# Patient Record
Sex: MALE
[De-identification: names, ages, dates, MRNs, and addresses within clinical notes are randomized per-mention and may not be internally consistent; named-entity substitution may affect disease eponyms.]

---

## 2017-05-30 ENCOUNTER — HOSPITAL ENCOUNTER (OUTPATIENT)
Dept: HOSPITAL 31 - C.ENDO | Age: 52
Discharge: HOME | End: 2017-05-30
Attending: SPECIALIST
Payer: COMMERCIAL

## 2017-05-30 VITALS — RESPIRATION RATE: 18 BRPM

## 2017-05-30 VITALS — OXYGEN SATURATION: 98 % | TEMPERATURE: 97.5 F

## 2017-05-30 VITALS — HEART RATE: 65 BPM | SYSTOLIC BLOOD PRESSURE: 144 MMHG | DIASTOLIC BLOOD PRESSURE: 86 MMHG

## 2017-05-30 VITALS — BODY MASS INDEX: 27.2 KG/M2

## 2017-05-30 DIAGNOSIS — K64.8: ICD-10-CM

## 2017-05-30 DIAGNOSIS — K57.90: ICD-10-CM

## 2017-05-30 DIAGNOSIS — K58.9: ICD-10-CM

## 2017-05-30 DIAGNOSIS — K51.50: Primary | ICD-10-CM

## 2017-05-30 PROCEDURE — 45380 COLONOSCOPY AND BIOPSY: CPT

## 2017-05-30 PROCEDURE — 88305 TISSUE EXAM BY PATHOLOGIST: CPT

## 2017-05-30 NOTE — CP.SDSHP
[facility] Day [facility]





- History


Proposed Procedure: COLONSCOPY


Pre-Op Diagnosis: SCREENING





- Previous Medical/Surgical History


Misc: Other


Pain: 2.Mild Pain





- Allergies


Allergies: 


Allergies





ciprofloxacin [From Cipro] Adverse Reaction (Verified 05/30/17 10:31)


 FATIGUE











- Physical Exam


General Appearance: N


Vital Signs: 


 Vital Signs











  05/30/17





  10:35


 


Temperature 98 F


 


Pulse Rate 65


 


Respiratory 16





Rate 


 


Blood Pressure 119/78


 


O2 Sat by Pulse 100





Oximetry 











Mental Status: Alert & Oriented x3


Neuro: WNL


Heart: WNL


Lungs: WNL


GI: WNL





- {Optional Preform as Required}


Breast: WNL


Abdomen: WNL


Rectal: Other


Integument: WNL


: WNL


Ortho: WNL


ENT: WNL





- Impression


Pt. Evaluated Today:Candidate for Anesthesia & Procedure: Yes





- Date & Time


Time: 11:05





Short Stay Discharge





- Short Stay Discharge


Admitting Diagnosis/Reason for Visit: SCREENING FOR MALIGNANT NEOPLASM OF COLON


Disposition: HOME/ ROUTINE

## 2018-08-10 ENCOUNTER — HOSPITAL ENCOUNTER (INPATIENT)
Dept: HOSPITAL 31 - C.ER | Age: 53
LOS: 3 days | Discharge: HOME | DRG: 182 | End: 2018-08-13
Attending: INTERNAL MEDICINE | Admitting: INTERNAL MEDICINE
Payer: COMMERCIAL

## 2018-08-10 VITALS — BODY MASS INDEX: 33.2 KG/M2

## 2018-08-10 VITALS — RESPIRATION RATE: 20 BRPM

## 2018-08-10 DIAGNOSIS — F17.200: ICD-10-CM

## 2018-08-10 DIAGNOSIS — K62.5: ICD-10-CM

## 2018-08-10 DIAGNOSIS — K59.00: ICD-10-CM

## 2018-08-10 DIAGNOSIS — Z87.11: ICD-10-CM

## 2018-08-10 DIAGNOSIS — K57.32: Primary | ICD-10-CM

## 2018-08-10 DIAGNOSIS — G47.30: ICD-10-CM

## 2018-08-10 LAB
ALBUMIN SERPL-MCNC: 4.7 G/DL (ref 3.5–5)
ALBUMIN/GLOB SERPL: 1.4 {RATIO} (ref 1–2.1)
ALT SERPL-CCNC: 26 U/L (ref 21–72)
AST SERPL-CCNC: 15 U/L (ref 17–59)
BASOPHILS # BLD AUTO: 0 K/UL (ref 0–0.2)
BASOPHILS NFR BLD: 0.1 % (ref 0–2)
BUN SERPL-MCNC: 18 MG/DL (ref 9–20)
CALCIUM SERPL-MCNC: 9.9 MG/DL (ref 8.6–10.4)
EOSINOPHIL # BLD AUTO: 0 K/UL (ref 0–0.7)
EOSINOPHIL NFR BLD: 0 % (ref 0–4)
ERYTHROCYTE [DISTWIDTH] IN BLOOD BY AUTOMATED COUNT: 14 % (ref 11.5–14.5)
GFR NON-AFRICAN AMERICAN: > 60
HGB BLD-MCNC: 16.6 G/DL (ref 12–18)
LIPASE: 25 U/L (ref 23–300)
LYMPHOCYTE: 8 % (ref 20–40)
LYMPHOCYTES # BLD AUTO: 0.6 K/UL (ref 1–4.3)
LYMPHOCYTES NFR BLD AUTO: 5 % (ref 20–40)
MCH RBC QN AUTO: 31.1 PG (ref 27–31)
MCHC RBC AUTO-ENTMCNC: 35.7 G/DL (ref 33–37)
MCV RBC AUTO: 87.1 FL (ref 80–94)
MONOCYTE: 0 % (ref 0–10)
MONOCYTES # BLD: 0.2 K/UL (ref 0–0.8)
MONOCYTES NFR BLD: 1.3 % (ref 0–10)
NEUTROPHILS # BLD: 11.4 K/UL (ref 1.8–7)
NEUTROPHILS NFR BLD AUTO: 92 % (ref 50–75)
NEUTROPHILS NFR BLD AUTO: 93.6 % (ref 50–75)
NRBC BLD AUTO-RTO: 0.3 % (ref 0–2)
PLATELET # BLD EST: NORMAL 10*3/UL
PLATELET # BLD: 237 K/UL (ref 130–400)
PMV BLD AUTO: 7.6 FL (ref 7.2–11.7)
RBC # BLD AUTO: 5.33 MIL/UL (ref 4.4–5.9)
TOTAL CELLS COUNTED BLD: 100
WBC # BLD AUTO: 12.1 K/UL (ref 4.8–10.8)

## 2018-08-10 NOTE — C.PDOC
History Of Present Illness





<Reina Navas - Last Filed: 08/10/18 18:52>





<Obed Segura - Last Filed: 08/10/18 20:53>


52-YEAR-OLD MALE, PRESENTS TO THE EMERGENCY DEPARTMENT WITH COMPLAINTS OF 

ABDOMINAL DISCOMFORT AND CONSTIPATION FOR THE PAST SEVERAL DAYS. PS "I THINK I 

HAVE DIVERTICULITIS." (Reina Navas)


History Per: Patient


History/Exam Limitations: no limitations


Current Symptoms Are (Timing): Still Present


Severity: Moderate





<Reina Navas - Last Filed: 08/10/18 18:52>





<Obed Segura - Last Filed: 08/10/18 20:53>


Time Seen by Provider: 08/10/18 16:45


Chief Complaint (Nursing): Abdominal Pain





Past Medical History


Reviewed: Historical Data, Nursing Documentation, Vital Signs





- Medical History


PMH: Diverticulitis, Sleep Apnea


Family History: States: No Known Family Hx





- Social History


Hx Alcohol Use: No


Hx Substance Use: Yes





- Immunization History


Hx Tetanus Toxoid Vaccination: No


Hx Influenza Vaccination: No


Hx Pneumococcal Vaccination: No





<Reina Navas - Last Filed: 08/10/18 18:52>


Reviewed: Historical Data, Nursing Documentation, Vital Signs


Family History: States: No Known Family Hx





<Obed Segura - Last Filed: 08/10/18 20:53>


Vital Signs: 





 Last Vital Signs











Temp  98.4 F   08/10/18 18:34


 


Pulse  92 H  08/10/18 18:34


 


Resp  18   08/10/18 18:34


 


BP  181/88 H  08/10/18 18:34


 


Pulse Ox  98   08/10/18 18:52














Review Of Systems


Constitutional: Negative for: Fever


Respiratory: Negative for: Shortness of Breath


Gastrointestinal: Positive for: Abdominal Pain, Constipation.  Negative for: 

Nausea, Vomiting


Musculoskeletal: Negative for: Back Pain


Neurological: Negative for: Weakness, Numbness





<Reina Navas - Last Filed: 08/10/18 18:52>





Physical Exam





- Physical Exam


Appears: Non-toxic, No Acute Distress


Skin: Normal Color, Warm, Dry, No Rash


Head: Atraumatic, Normacephalic


Eye(s): bilateral: Normal Inspection, PERRL, EOMI


Nose: Normal


Oral Mucosa: Moist


Lips: Normal Appearing


Neck: Normal ROM


Cardiovascular: Rhythm Regular, No Murmur


Respiratory: Normal Breath Sounds, No Accessory Muscle Use


Gastrointestinal/Abdominal: Soft, Tenderness (b/l lower quadrant. +bloating), 

No Guarding, No Rebound


Back: Normal Inspection


Extremity: Normal ROM


Neurological/Psych: Oriented x3, Normal Speech





<Reina Navas - Last Filed: 08/10/18 18:52>





ED Course And Treatment





- Laboratory Results


Result Diagrams: 


 08/10/18 17:13





 08/10/18 17:13


O2 Sat by Pulse Oximetry: 98


Pulse Ox Interpretation: Normal (ra)





<Reina Navas - Last Filed: 08/10/18 18:52>





- Laboratory Results


Result Diagrams: 


 08/10/18 17:13





 08/10/18 17:13


Pulse Ox Interpretation: Normal





<Obed Segura - Last Filed: 08/10/18 20:53>





Progress





- Data Reviewed


Data Reviewed: Lab, Diagnostic imaging, Old records





<Reina Navas - Last Filed: 08/10/18 18:52>





Disposition


Counseled Patient/Family Regarding: Studies Performed, Diagnosis





- Disposition


Disposition Time: 19:00





<Reina Navas - Last Filed: 08/10/18 18:52>


Discussed With : Vesna Posadas


Comment: accepted the pt on his service and took overr the care at 8:51PM


Doctor Will See Patient In The: Hospital


Counseled Patient/Family Regarding: Studies Performed, Diagnosis





<SheriseanObed - Last Filed: 08/10/18 20:53>





- Disposition


Disposition: HOSPITALIZED


Condition: FAIR


Forms:  [facility] (English)





- Clinical Impression


Clinical Impression: 


 Abdominal pain, Acute diverticulitis








- Scribe Statement


The provider has reviewed the documentation as recorded by the Scribe (Miracle Smyth)





<Reina Navas - Last Filed: 08/10/18 18:52>





<Obed Segura - Last Filed: 08/10/18 20:53>





- Scribe Statement








All medical record entries made by the Scribe were at my direction and 

personally dictated by me. I have reviewed the chart and agree that the record 

accurately reflects my personal performance of the history, physical exam, 

medical decision making, and the department course for this patient. I have 

also personally directed, reviewed, and agree with the discharge instructions 

and disposition. (Reina Navas)





Physician Patient Turnover


Patient Signed Over To: Obed Segura


Handoff Comments: FU CT, DISPO





<Reina Navas - Last Filed: 08/10/18 18:52>





Decision To Admit





<Reina Navas - Last Filed: 08/10/18 18:52>





- Pt Status Changed To:


Hospital Disposition Of: Inpatient





- Admit Certification


Admit to Inpatient:: After my assessment, the patient will require 

hospitalization for at least two midnights.  This is because of the severity of 

symptoms shown, intensity of services needed, and/or the medical risk in this 

patient being treated as an outpatient.





- InPatient:


Physician Admission Certification:: After my assessment, the patient will 

require hospitalization for at least two midnights.  This is because of the 

severity of symptoms shown, intensity of services needed, and/or the medical 

risk in this patient being treated as an outpatient.





- .


Bed Request Type: Regular


Admitting Physician: Vesna Posadas





<Obed Segura - Last Filed: 08/10/18 20:53>





- .


Patient Diagnosis: 


 Abdominal pain, Acute diverticulitis

## 2018-08-11 VITALS — OXYGEN SATURATION: 96 %

## 2018-08-11 LAB
BILIRUB UR-MCNC: NEGATIVE MG/DL
GLUCOSE UR STRIP-MCNC: NORMAL MG/DL
LEUKOCYTE ESTERASE UR-ACNC: (no result) LEU/UL
PH UR STRIP: 6 [PH] (ref 5–8)
PROT UR STRIP-MCNC: (no result) MG/DL
RBC # UR STRIP: (no result) /UL
SP GR UR STRIP: 1.03 (ref 1–1.03)
UROBILINOGEN UR-MCNC: NORMAL MG/DL (ref 0.2–1)

## 2018-08-11 RX ADMIN — TAZOBACTAM SODIUM AND PIPERACILLIN SODIUM SCH MLS/HR: 375; 3 INJECTION, SOLUTION INTRAVENOUS at 21:18

## 2018-08-11 RX ADMIN — TAZOBACTAM SODIUM AND PIPERACILLIN SODIUM SCH MLS/HR: 375; 3 INJECTION, SOLUTION INTRAVENOUS at 04:15

## 2018-08-11 RX ADMIN — DEXTROSE AND SODIUM CHLORIDE SCH MLS/HR: 5; 450 INJECTION, SOLUTION INTRAVENOUS at 03:45

## 2018-08-11 RX ADMIN — TAZOBACTAM SODIUM AND PIPERACILLIN SODIUM SCH MLS/HR: 375; 3 INJECTION, SOLUTION INTRAVENOUS at 09:38

## 2018-08-11 RX ADMIN — TAZOBACTAM SODIUM AND PIPERACILLIN SODIUM SCH MLS/HR: 375; 3 INJECTION, SOLUTION INTRAVENOUS at 16:00

## 2018-08-11 RX ADMIN — DEXTROSE AND SODIUM CHLORIDE SCH MLS/HR: 5; 450 INJECTION, SOLUTION INTRAVENOUS at 14:27

## 2018-08-11 RX ADMIN — ENOXAPARIN SODIUM SCH: 40 INJECTION SUBCUTANEOUS at 10:59

## 2018-08-12 LAB
ALBUMIN SERPL-MCNC: 3.9 G/DL (ref 3.5–5)
ALBUMIN/GLOB SERPL: 1.4 {RATIO} (ref 1–2.1)
ALT SERPL-CCNC: 22 U/L (ref 21–72)
APTT BLD: 31 SECONDS (ref 21–34)
AST SERPL-CCNC: 13 U/L (ref 17–59)
BASOPHILS # BLD AUTO: 0.1 K/UL (ref 0–0.2)
BASOPHILS NFR BLD: 0.7 % (ref 0–2)
BUN SERPL-MCNC: 10 MG/DL (ref 9–20)
CALCIUM SERPL-MCNC: 8.9 MG/DL (ref 8.6–10.4)
EOSINOPHIL # BLD AUTO: 0.1 K/UL (ref 0–0.7)
EOSINOPHIL NFR BLD: 1.1 % (ref 0–4)
ERYTHROCYTE [DISTWIDTH] IN BLOOD BY AUTOMATED COUNT: 13.6 % (ref 11.5–14.5)
GFR NON-AFRICAN AMERICAN: > 60
HGB BLD-MCNC: 15.2 G/DL (ref 12–18)
INR PPP: 1.2
LYMPHOCYTES # BLD AUTO: 2 K/UL (ref 1–4.3)
LYMPHOCYTES NFR BLD AUTO: 16.2 % (ref 20–40)
MCH RBC QN AUTO: 31 PG (ref 27–31)
MCHC RBC AUTO-ENTMCNC: 35.3 G/DL (ref 33–37)
MCV RBC AUTO: 87.7 FL (ref 80–94)
MONOCYTES # BLD: 1.2 K/UL (ref 0–0.8)
MONOCYTES NFR BLD: 10 % (ref 0–10)
NEUTROPHILS # BLD: 8.7 K/UL (ref 1.8–7)
NEUTROPHILS NFR BLD AUTO: 72 % (ref 50–75)
NRBC BLD AUTO-RTO: 0 % (ref 0–2)
PLATELET # BLD: 219 K/UL (ref 130–400)
PMV BLD AUTO: 7.5 FL (ref 7.2–11.7)
PROTHROMBIN TIME: 12.8 SECONDS (ref 9.7–12.2)
RBC # BLD AUTO: 4.9 MIL/UL (ref 4.4–5.9)
WBC # BLD AUTO: 12.1 K/UL (ref 4.8–10.8)

## 2018-08-12 RX ADMIN — TAZOBACTAM SODIUM AND PIPERACILLIN SODIUM SCH MLS/HR: 375; 3 INJECTION, SOLUTION INTRAVENOUS at 22:00

## 2018-08-12 RX ADMIN — DEXTROSE AND SODIUM CHLORIDE SCH MLS/HR: 5; 450 INJECTION, SOLUTION INTRAVENOUS at 01:20

## 2018-08-12 RX ADMIN — DEXTROSE AND SODIUM CHLORIDE SCH MLS/HR: 5; 450 INJECTION, SOLUTION INTRAVENOUS at 11:23

## 2018-08-12 RX ADMIN — TAZOBACTAM SODIUM AND PIPERACILLIN SODIUM SCH MLS/HR: 375; 3 INJECTION, SOLUTION INTRAVENOUS at 15:56

## 2018-08-12 RX ADMIN — DEXTROSE AND SODIUM CHLORIDE SCH: 5; 450 INJECTION, SOLUTION INTRAVENOUS at 20:04

## 2018-08-12 RX ADMIN — TAZOBACTAM SODIUM AND PIPERACILLIN SODIUM SCH MLS/HR: 375; 3 INJECTION, SOLUTION INTRAVENOUS at 10:19

## 2018-08-12 RX ADMIN — ENOXAPARIN SODIUM SCH: 40 INJECTION SUBCUTANEOUS at 09:26

## 2018-08-12 RX ADMIN — TAZOBACTAM SODIUM AND PIPERACILLIN SODIUM SCH MLS/HR: 375; 3 INJECTION, SOLUTION INTRAVENOUS at 04:04

## 2018-08-12 NOTE — CON
Copied To:  Steve Reyes MD

Attending MD:  Steve Reyes MD



DATE:  08/11/2018That is from Dr. Steve Reyes to Dr. Vesna Posadas.



I was called for GI consultation by the admitting MD as well as the ER

staff last night.  The patient is seen and fully examined on 08/11/2018. 

The entire chart is reviewed including but not limited to most recent lab

and radiology study results, current and the previous medication list,

current and the previous medical events, allergy to medication list as well

as all the available current and the previous medical records.  Case

discussed with the staff at length.



HISTORY OF PRESENT ILLNESS:  This is a 52-year-old male who was admitted to

the hospital through the emergency room with severe abdominal pain,

recurrent episodes of nausea and vomiting, dyspepsia with change of bowel

movement for the last 4 to 5 days prior to his admission with loss of

appetite.  No reported hematemesis, but rectal bleeding on and off.  No

reported chest pain, palpitation, significant shortness of breath, or

actual chills or fever.



PAST MEDICAL HISTORY:   Including peptic ulcer disease, diverticulosis,

internal hemorrhoids with reported borderline hypertension.



MEDICATIONS:    Current medications plus admission medication lists were

reviewed.



FAMILY HISTORY:  Noncontributory.



SOCIAL HISTORY:  Denied any recent alcohol intake but positive for

substance abuse.



ALLERGIES TO MEDICATIONS:  UNCLEAR.



LABORATORY DATA:   Initial blood workup post admission showed leukocytosis

of 12.1.  Blood glucose level 144 with CO2 content of 18 indicative of

metabolic acidosis.



PHYSICAL EXAMINATION:

GENERAL:  A 52-year-old male, awake, alert, oriented, complaining of severe

midepigastric and left lower quadrant pain, admitted with periods of nausea

and vomiting for the last 3 to 4 days prior to his admission.

VITAL SIGNS:  The patient is afebrile with pulse of 94, respiratory rate 20

to 22, blood pressure 178/82.

HEENT:  Showed pale dry oral mucoid membrane mildly.  Nonicteric sclerae.

LYMPH NODES:  No lymphadenitis or lymphadenopathy.

HEART:  Positive S1 and S2 with increased rate.

LUNGS:  Clear.  Breathing sounds are present bilaterally but decreased at

bases.

ABDOMEN:  Soft with mild-to-moderate distention with midepigastric as well

as left lower quadrant abdominal tenderness.  No mass or organomegaly.  No

rebound tenderness or guarding.

RECTAL:  The patient refused.

EXTREMITIES:   Without significant clubbing, cyanosis, or edema.

NEUROLOGIC:  No reported new neurological deficits, sensory or motor.  No

focal deficits reported.



IMPRESSION:

1.  Re-exacerbation of peptic ulcer disease.

2.  Diverticulosis with acute diverticulitis and change of bowel movement

habit.

3.  Rule out occult gastrointestinal malignancy.



SUGGESTIONS:

1.  Agree with your plan.

2.  Rehydration.

3.  Keep the patient n.p.o. for now except oral medication.

4.  IV Flagyl.

5.  Proton pump inhibitors.

6.  Reglan IV.

7.  The patient may need endoscopic evaluation of the upper GI tract in the

meantime, if his symptoms of nausea and dyspepsia persists.

8.  Cancer markers.

9.  We will follow up closely with you.



Thank you for letting me participate in your patient's case management.





__________________________________________

Steve Reyes MD



DD:  08/11/2018 23:26:22

DT:  08/11/2018 23:31:18

Job # [id number]

## 2018-08-12 NOTE — PN
Copied To:  Steve Reyes MD

Attending MD:  Steve Reyes MD



DATE:  08/12/2018



LOCATION:  369 bed B.



SUBJECTIVE:  This is a 52-year-old male seen and examined initially for GI

consultation on 08/11/2018, reexamined again today, appeared to be awake

and alert with a complaint of intermittent period of midepigastric and left

lower quadrant pain with some nausea and dyspepsia.  No reported active

bleeding.



The entire chart is reviewed including but not limited to most recent lab

and radiology study results, current and the previous medication list,

current and the previous medical events, and today's lab results still

pending.



Official report of the CAT scan of the abdomen and pelvis is seen at the

time of the admission indicative of acute sigmoid diverticulitis and the

colonoscopy was suggested.



PHYSICAL EXAMINATION:

GENERAL:  A 52-year-old male.

VITAL SIGNS:  Afebrile with pulse of 72, respiratory rate 18 to 20, blood

pressure of 120/82.

HEENT:  Showed mildly pale, dry oral mucous membrane.  Nonicteric sclerae.

LUNGS:  Few scattered crepitation.  Decreased air entry at bases.

HEART:  Positive S1 and S2.

ABDOMEN:  Soft with mild generalized tenderness, but mainly in the

midepigastric and left lower quadrant area.  No mass or organomegaly.  No

rebound tenderness or guarding.

EXTREMITIES:  Without edema, clubbing or cyanosis.

NEUROLOGICAL:  No reported new focal deficits, sensory or motor.



IMPRESSION:

1.  Diverticulosis with acute diverticulitis.

2.  Re-exacerbation of peptic ulcer disease, to rule out gastric versus

duodenal ulcer.

3.  Borderline hypertension with known history of sleep apnea, stable.



SUGGESTIONS:

1.  Agree with your plan.

2.  Advance diet gradually.

3.  Endoscopic evaluation of the GI tract when the patient is more stable

clinically.



Further recommendation to follow.







__________________________________________

Steve Reyes MD





DD:  08/12/2018 8:04:30

DT:  08/12/2018 8:07:16

Job # [id number]

## 2018-08-13 VITALS — TEMPERATURE: 98.1 F | SYSTOLIC BLOOD PRESSURE: 137 MMHG | DIASTOLIC BLOOD PRESSURE: 84 MMHG

## 2018-08-13 VITALS — HEART RATE: 60 BPM

## 2018-08-13 RX ADMIN — TAZOBACTAM SODIUM AND PIPERACILLIN SODIUM SCH MLS/HR: 375; 3 INJECTION, SOLUTION INTRAVENOUS at 09:27

## 2018-08-13 RX ADMIN — TAZOBACTAM SODIUM AND PIPERACILLIN SODIUM SCH MLS/HR: 375; 3 INJECTION, SOLUTION INTRAVENOUS at 04:03

## 2018-08-13 RX ADMIN — ENOXAPARIN SODIUM SCH: 40 INJECTION SUBCUTANEOUS at 09:22

## 2018-08-13 NOTE — CP.PCM.PN
Subjective





- Date & Time of Evaluation


Date of Evaluation: 08/13/18


Time of Evaluation: 07:20





- Subjective


Subjective: 





PGY3 Resident - Medicine Progress Note for Dr. Posadas





Patient seen and examined at bedside. No acute distress. No overnight events. 

Patient reports he is feeling well. Tolerating bland diet. 12-point review of 

systems is otherwise negative without any additional acute complaints.


---





Patient is stable for discharge per Dr. Posadas. Patient should resume all 

medications as outlined in this document. Additionally, patient should take the 

new medications listed below (scripts provided). Please make an appointment and 

follow up with your Primary Doctor within one week of discharge. Please make an 

appointment with a Gastroenterologist or a Colorectal Surgeon for your 

diagnosis of Diverticulitis. Patient should return to ED immediately if 

symptoms return or worsen.  Instructions discussed with patient who understood 

and agreed.





Newly prescribed medications:


Metro 500mg PO q8H #21


Cefdinir 300mg PO BID #14











Objective





- Vital Signs/Intake and Output


Vital Signs (last 24 hours): 


 











Temp Pulse Resp BP Pulse Ox


 


 98.2 F   60   20   116/68   96 


 


 08/13/18 00:00  08/13/18 00:00  08/13/18 00:00  08/13/18 00:00  08/13/18 00:00








Intake and Output: 


 











 08/13/18 08/13/18





 06:59 18:59


 


Intake Total 200 


 


Balance 200 














- Medications


Medications: 


 Current Medications





Enoxaparin Sodium (Lovenox)  40 mg SC DAILY [facility]


   Last Admin: 08/12/18 09:26 Dose:  Not Given


Metronidazole (Flagyl)  500 mg in 100 mls @ 100 mls/hr IVPB STAT [facility]


   PRN Reason: Protocol


Piperacillin Sod/Tazobactam Sod (Zosyn 3.375 Gm Iv Premix)  3.375 gm in 50 mls 

@ 100 mls/hr IVPB Q6H [facility]


   PRN Reason: Protocol


   Last Admin: 08/13/18 04:03 Dose:  100 mls/hr


Dextrose/Sodium Chloride (Dextrose 5%/0.45% Ns 1000 Ml)  1,000 mls @ 100 mls/hr 

IV .Q10H [facility]


   Last Admin: 08/12/18 20:04 Dose:  Not Given


Morphine Sulfate (Morphine)  2 mg IVP Q4 PRN


   PRN Reason: Pain, severe (8-10)


Ondansetron HCl (Zofran Inj)  4 mg IVP Q6 PRN


   PRN Reason: Nausea/Vomiting


   Last Admin: 08/11/18 04:20 Dose:  4 mg


Pantoprazole Sodium (Protonix Inj)  40 mg IVP DAILY [facility]


   Last Admin: 08/12/18 09:26 Dose:  Not Given


Zolpidem Tartrate (Ambien)  5 mg PO HS PRN


   PRN Reason: Insomnia


   Last Admin: 08/11/18 21:43 Dose:  5 mg











- Labs


Labs: 


 





 08/12/18 10:40 





 08/12/18 10:40 





 











PT  12.8 SECONDS (9.7-12.2)  H  08/12/18  10:40    


 


INR  1.2   08/12/18  10:40    


 


APTT  31 SECONDS (21-34)   08/12/18  10:40    














- Additional Findings


Additional findings: 





- Constitutional


Appears: Non-toxic, No Acute Distress





- Head Exam


Head Exam: ATRAUMATIC, NORMAL INSPECTION





- Eye Exam


Eye Exam: EOMI, Normal appearance





- ENT Exam


ENT Exam: Mucous Membranes Dry





- Neck Exam


Neck Exam: absent: Tenderness, Lymphadenopathy





- Respiratory Exam


Respiratory Exam: NORMAL BREATHING PATTERN.  absent: Rales, Wheezes





- Cardiovascular Exam


Cardiovascular Exam: Regular Rate, +S1, +S2





- GI/Abdominal Exam


GI & Abdominal Exam: Soft, Normal Bowel Sounds.  absent: Tenderness





- Extremities Exam


Extremities Exam: Full ROM, Normal Inspection.  absent: Pedal Edema, Tenderness





- Back Exam


Back Exam: NORMAL INSPECTION.  absent: CVA tenderness (L), CVA tenderness (R)





- Neurological Exam


Neurological Exam: Alert, Awake, Oriented x3





- Psychiatric Exam


Psychiatric exam: Normal Affect, Normal Mood





- Skin


Skin Exam: Dry, Intact, Normal Color, Warm








Assessment and Plan





- Assessment and Plan (Free Text)


Assessment: 








Acute Diverticulitis


8/13: stable for discharge per Dr. Colindres


-CT abdomen - acute sigmoid diverticulosis with evidence of diverticulitis. see 

full report.


-advance diet gradually


-endoscopic eval of GI when patient is more stable.


-Continue flagyl 2gm IVPB q4H and Zosyn 3.375Gm IVPB Q6H


-continue zofran 4mg IVP q6H PRN





Case discussed with attending. All medical management as per Dr. NINA Posadas.

## 2018-08-13 NOTE — PN
Copied To:  Steve Reyes MD

Attending MD:  Steve Reyes MD



DATE:  08/13/2018



LOCATION:  369, bed B.



SUBJECTIVE:  This is a 52-year-old male, seen and examined early in rounds

without any significant clinical changes, denying any abdominal pain or

evidence of active bleeding, tolerating oral intake well.



The entire chart is reviewed including but not limited to the most recent

lab and radiology study results, current and the previous medications list,

current and the previous medical events and today's lab results still

pending.



PHYSICAL EXAMINATION:

GENERAL:  A 52-year-old male, awake, alert and oriented.

VITAL SIGNS:  Afebrile with pulse of 62, respiratory rate of 20 to 22, and

blood pressure 120/66.

HEENT:  Showed pale, dry oral mucous membrane.  Nonicteric sclerae.

LUNGS:  Few scattered crepitation.  Decreased air entry at bases.

HEART:  Positive S1 and S2.

ABDOMEN:  Soft with sight distension.  No mass or organomegaly.  No rebound

tenderness or guarding.

EXTREMITIES:  Without edema, clubbing, or cyanosis.

NEUROLOGICAL:  No reported new neurological deficits, sensory, or motor.



IMPRESSION:

1.  Diverticulosis with diverticulitis, subsided.

2.  Re-exacerbation of peptic ulcer disease.

3.  Reported history of sleep apnea, stable.



SUGGESTIONS:

1.  Advance diet _____.

2.  May discharge home if he is stable clinically and if okay with the

admitting MD.







__________________________________________

Steve Reyes MD



DD:  08/13/2018 9:25:57

DT:  08/13/2018 10:35:27

Job # [id number]

## 2018-08-13 NOTE — HP
Copied To:  Vesna Posadas MD

Attending MD:  Vesna Posadas MD



HISTORY OF PRESENT ILLNESS:  The patient is a 52 year-old male _____

abdominal pain.  Patient has history of recurrent diverticulitis and

testicular abscess.  The patient is a smoker.  The patient drinks

occasionally.



PHYSICAL EXAMINATION:

GENERAL:  The patient is awake, alert, and oriented.

VITAL SIGNS:  Temperature 98, pulse 90.

HEENT:  Within normal limits.

NECK:  Supple.

CHEST:  Symmetrical.

HEART:  Regular.

ABDOMEN:  Tenderness in lower abdomen.

EXTREMITIES:  No edema.



ASSESSMENT AND PLAN:  The patient has diverticulitis.  The patient is to

bed rest, IV antibiotics, and supportive care.



__________________________________________

Vesna Posadas MD





DD:  08/11/2018 10:49:14

DT:  08/11/2018 19:26:12

Job # [id number]

## 2024-09-23 NOTE — CT
Date of service: 



08/10/2018



PROCEDURE:  CT Abdomen and Pelvis with contrast



HISTORY:

abd pain RO DIVERTICULITIS



COMPARISON:

None.



TECHNIQUE:

Contrast dose: Axial and reformatted coronal and sagittal CT images 

of the abdomen and pelvis were obtained after IV and oral contrast 

administration.



Radiation dose:



Total exam DLP = 100 mL Visipaque 320 mGy-cm.



This CT exam was performed using one or more of the following dose 

reduction techniques: Automated exposure control, adjustment of the 

mA and/or kV according to patient size, and/or use of iterative 

reconstruction technique.



FINDINGS:



LOWER THORAX:

Mild emphysematous changes noted. 



LIVER:

There are at least 2 small less than 5 millimeter low-attenuation 

lesion noted in the right liver lobe may represent incidental 

findings such as cyst or small hemangioma. The possibility of 

neoplasm is less likely. 



GALLBLADDER AND BILE DUCTS:

Unremarkable. 



PANCREAS:

Unremarkable. No gross lesion or ductal dilatation.



SPLEEN:

Unremarkable. 



ADRENALS:

Diffuse enlargement of the adrenal glands noted suggestive of 

hypertrophy. 



KIDNEYS AND URETERS:

Unremarkable. No hydronephrosis. No solid mass. 



VASCULATURE:

Unremarkable. No aortic aneurysm. 



BOWEL:

There is segmental enlargement of the sigmoid colon surrounding with 

mild inflammatory changes.  Findings suspicious for acute 

diverticulitis.  Scattered colonic diverticulosis noted. The 

descending colon is collapsed. No evidence of abscess formation. No 

evidence of small bowel obstruction.



APPENDIX:

Normal appendix. 



PERITONEUM:

Unremarkable. No free fluid. No free air. 



LYMPH NODES:

Unremarkable. No enlarged lymph nodes. 



BLADDER:

Unremarkable. 



REPRODUCTIVE:

Unremarkable. 



BONES:

No acute fracture. 



OTHER FINDINGS:

None.



IMPRESSION:

Findings suggestive of acute sigmoid diverticulitis.



Segmental thickening of the sigmoid colon.  Further assessment of the 

large bowel after the acute phase of diverticulitis is suggested to 

exclude underlying neoplasm. 



Additional findings as described above. 



Preliminary report was submitted by [facility]ulate Radiology. VTE Assessment already completed for this visit